# Patient Record
Sex: MALE | Race: ASIAN | NOT HISPANIC OR LATINO | URBAN - METROPOLITAN AREA
[De-identification: names, ages, dates, MRNs, and addresses within clinical notes are randomized per-mention and may not be internally consistent; named-entity substitution may affect disease eponyms.]

---

## 2021-08-06 ENCOUNTER — INPATIENT (INPATIENT)
Facility: HOSPITAL | Age: 61
LOS: 1 days | Discharge: HOME | DRG: 446 | End: 2021-08-08
Attending: STUDENT IN AN ORGANIZED HEALTH CARE EDUCATION/TRAINING PROGRAM | Admitting: STUDENT IN AN ORGANIZED HEALTH CARE EDUCATION/TRAINING PROGRAM
Payer: COMMERCIAL

## 2021-08-06 VITALS
RESPIRATION RATE: 18 BRPM | TEMPERATURE: 98 F | HEIGHT: 68 IN | SYSTOLIC BLOOD PRESSURE: 184 MMHG | WEIGHT: 188.05 LBS | DIASTOLIC BLOOD PRESSURE: 112 MMHG | HEART RATE: 96 BPM | OXYGEN SATURATION: 92 %

## 2021-08-06 LAB
ALBUMIN SERPL ELPH-MCNC: 4 G/DL — SIGNIFICANT CHANGE UP (ref 3.3–5)
ALP SERPL-CCNC: 128 U/L — HIGH (ref 40–120)
ALT FLD-CCNC: 260 U/L — HIGH (ref 10–45)
ANION GAP SERPL CALC-SCNC: 12 MMOL/L — SIGNIFICANT CHANGE UP (ref 5–17)
AST SERPL-CCNC: 121 U/L — HIGH (ref 10–40)
BASOPHILS # BLD AUTO: 0.06 K/UL — SIGNIFICANT CHANGE UP (ref 0–0.2)
BASOPHILS NFR BLD AUTO: 0.6 % — SIGNIFICANT CHANGE UP (ref 0–2)
BILIRUB DIRECT SERPL-MCNC: 5.4 MG/DL — HIGH (ref 0–0.2)
BILIRUB INDIRECT FLD-MCNC: 1.9 MG/DL — HIGH (ref 0.2–1)
BILIRUB SERPL-MCNC: 7.3 MG/DL — HIGH (ref 0.2–1.2)
BILIRUB SERPL-MCNC: 7.3 MG/DL — HIGH (ref 0.2–1.2)
BUN SERPL-MCNC: 14 MG/DL — SIGNIFICANT CHANGE UP (ref 7–23)
CALCIUM SERPL-MCNC: 9.6 MG/DL — SIGNIFICANT CHANGE UP (ref 8.4–10.5)
CHLORIDE SERPL-SCNC: 102 MMOL/L — SIGNIFICANT CHANGE UP (ref 96–108)
CO2 SERPL-SCNC: 21 MMOL/L — LOW (ref 22–31)
CREAT SERPL-MCNC: 0.88 MG/DL — SIGNIFICANT CHANGE UP (ref 0.5–1.3)
EOSINOPHIL # BLD AUTO: 0.06 K/UL — SIGNIFICANT CHANGE UP (ref 0–0.5)
EOSINOPHIL NFR BLD AUTO: 0.6 % — SIGNIFICANT CHANGE UP (ref 0–6)
GLUCOSE SERPL-MCNC: 130 MG/DL — HIGH (ref 70–99)
HCT VFR BLD CALC: 46.3 % — SIGNIFICANT CHANGE UP (ref 39–50)
HGB BLD-MCNC: 15.4 G/DL — SIGNIFICANT CHANGE UP (ref 13–17)
IMM GRANULOCYTES NFR BLD AUTO: 1.1 % — SIGNIFICANT CHANGE UP (ref 0–1.5)
LYMPHOCYTES # BLD AUTO: 0.89 K/UL — LOW (ref 1–3.3)
LYMPHOCYTES # BLD AUTO: 9.3 % — LOW (ref 13–44)
MCHC RBC-ENTMCNC: 31.9 PG — SIGNIFICANT CHANGE UP (ref 27–34)
MCHC RBC-ENTMCNC: 33.3 GM/DL — SIGNIFICANT CHANGE UP (ref 32–36)
MCV RBC AUTO: 95.9 FL — SIGNIFICANT CHANGE UP (ref 80–100)
MONOCYTES # BLD AUTO: 0.97 K/UL — HIGH (ref 0–0.9)
MONOCYTES NFR BLD AUTO: 10.1 % — SIGNIFICANT CHANGE UP (ref 2–14)
NEUTROPHILS # BLD AUTO: 7.5 K/UL — HIGH (ref 1.8–7.4)
NEUTROPHILS NFR BLD AUTO: 78.3 % — HIGH (ref 43–77)
NRBC # BLD: 0 /100 WBCS — SIGNIFICANT CHANGE UP (ref 0–0)
PLATELET # BLD AUTO: 210 K/UL — SIGNIFICANT CHANGE UP (ref 150–400)
POTASSIUM SERPL-MCNC: 3.7 MMOL/L — SIGNIFICANT CHANGE UP (ref 3.5–5.3)
POTASSIUM SERPL-SCNC: 3.7 MMOL/L — SIGNIFICANT CHANGE UP (ref 3.5–5.3)
PROT SERPL-MCNC: 7.2 G/DL — SIGNIFICANT CHANGE UP (ref 6–8.3)
RBC # BLD: 4.83 M/UL — SIGNIFICANT CHANGE UP (ref 4.2–5.8)
RBC # FLD: 12.7 % — SIGNIFICANT CHANGE UP (ref 10.3–14.5)
SODIUM SERPL-SCNC: 135 MMOL/L — SIGNIFICANT CHANGE UP (ref 135–145)
WBC # BLD: 9.59 K/UL — SIGNIFICANT CHANGE UP (ref 3.8–10.5)
WBC # FLD AUTO: 9.59 K/UL — SIGNIFICANT CHANGE UP (ref 3.8–10.5)

## 2021-08-06 NOTE — ED PROVIDER NOTE - OBJECTIVE STATEMENT
61M, no pmh, smoker, p.w fever and jaundice. patient has fever 2 days ago and generalize myalgia. patient went to  today for covid eval and was told to come in after noticing dark urine. patient has generalized abdominal pain but no surgeries. no dysuria, n/v. +HA no headache or change in medication.

## 2021-08-06 NOTE — ED PROVIDER NOTE - CLINICAL SUMMARY MEDICAL DECISION MAKING FREE TEXT BOX
former smoker p.w jaundice and dark urine w. fver for 2days. no dysuria, change in meds, n/v, weight loss. +myalgia. vital signs wnl, nontoxic appearing, NAD. no abdominal pain and aox3, concerning for bilirubinemia vs biliary mass vs hepatitis, vs cholecystitis vs cholangitis though low suspicion, no abdominal pain on exam, but will get RUQ US for biliary obstruction and hepatic eval. will get comprehensive labs to evaluate for hematologic abnormality, renal function, electrolyte derangement, will get hepatitis panel, no travel or diarrhea. dispo pending labs/imaging/further workups. former smoker p.w jaundice and dark urine w. fver for 2days. no dysuria, change in meds, n/v, weight loss. +myalgia. vital signs wnl, nontoxic appearing, NAD. no abdominal pain and aox3, concerning for bilirubinemia vs biliary mass vs hepatitis, vs cholecystitis vs cholangitis vs choledocholithiasis, no abdominal pain on exam, but will get RUQ US for biliary obstruction and hepatic eval. will get comprehensive labs to evaluate for hematologic abnormality, renal function, electrolyte derangement, will get hepatitis panel, no travel or diarrhea. dispo pending labs/imaging/further workups.

## 2021-08-06 NOTE — ED PROVIDER NOTE - NS ED ROS FT
GENERAL: fever no chills, weight changes, nightsweats  EYES: no change in vision  HEENT: no dysplasia, odynophagia, ear pain, rhinorrhea, epistasis   CARDIAC: no chest pain, palpitation   PULMONARY: no productive cough or SOB  GI: no abdominal pain, no nausea or no vomiting, no diarrhea or constipation  : No changes in urination for pain/freq.   SKIN: no bruising   NEURO: no headache, numbness/tingling, extremity weakness   MSK: No joint pain

## 2021-08-06 NOTE — ED PROVIDER NOTE - PROGRESS NOTE DETAILS
Attending Cristina:  received s/o on pt, us not yet read. Called rads, apparently study was not pushed over so he was not aware of study. Getting read now.

## 2021-08-06 NOTE — ED ADULT NURSE NOTE - OBJECTIVE STATEMENT
The pt is a 62 y/o M no PMH presenting to the ED c/o abd pain, loose stools, fever, nausea, dec appetite, dark urine, HA, jaundice x 2 days.  The patient reports generalized abd pain. Pt reports he went to urgent care for a rapid covid swab, which resulted as negative, as per the patient. Upon assessment, pt is AO x 4, speaking in full and complete sentences, abd soft, equal strength bilaterally, skin warm, dry and intact, present peripheral pulses, PERRL. Pt denies CP, dizziness, weakness, SOB. Pt positioned for comfort, appropriate side rails raised, wheels locked, bed in lowest position, pt denies needs at this time.

## 2021-08-06 NOTE — ED PROVIDER NOTE - PHYSICAL EXAMINATION
General: NAD, good hygiene, well developed  HENT: Atraumatic, EOMI, sclera ictus b/l, no conjunctivae injection, moist mucosa.  Neck: normal ROM and trachea midline   Cardiovascular: RRR, S1&2, no M or R, radial pulses equal and b/l  Respiratory: CTABL, no wheezes or crackles, no decreased breath sounds  Abdominal: soft and non-tender non distended, neg for guarding, no CVA tenderness   Extremities: no edema of the legs/feet, distal pulses equal and b/l   Skin: jaundice, well perfused  Neurologic: nonfocal, AAOx3, following commands   Psych: normal mood and affect

## 2021-08-07 DIAGNOSIS — R17 UNSPECIFIED JAUNDICE: ICD-10-CM

## 2021-08-07 DIAGNOSIS — Z00.00 ENCOUNTER FOR GENERAL ADULT MEDICAL EXAMINATION WITHOUT ABNORMAL FINDINGS: ICD-10-CM

## 2021-08-07 DIAGNOSIS — R10.9 UNSPECIFIED ABDOMINAL PAIN: ICD-10-CM

## 2021-08-07 LAB
A1C WITH ESTIMATED AVERAGE GLUCOSE RESULT: 4.8 % — SIGNIFICANT CHANGE UP (ref 4–5.6)
ALBUMIN SERPL ELPH-MCNC: 3.9 G/DL — SIGNIFICANT CHANGE UP (ref 3.3–5)
ALP SERPL-CCNC: 125 U/L — HIGH (ref 40–120)
ALT FLD-CCNC: 212 U/L — HIGH (ref 10–45)
ANION GAP SERPL CALC-SCNC: 16 MMOL/L — SIGNIFICANT CHANGE UP (ref 5–17)
APTT BLD: 33.5 SEC — SIGNIFICANT CHANGE UP (ref 27.5–35.5)
AST SERPL-CCNC: 82 U/L — HIGH (ref 10–40)
BILIRUB SERPL-MCNC: 4 MG/DL — HIGH (ref 0.2–1.2)
BLD GP AB SCN SERPL QL: NEGATIVE — SIGNIFICANT CHANGE UP
BUN SERPL-MCNC: 14 MG/DL — SIGNIFICANT CHANGE UP (ref 7–23)
CALCIUM SERPL-MCNC: 9.7 MG/DL — SIGNIFICANT CHANGE UP (ref 8.4–10.5)
CHLORIDE SERPL-SCNC: 99 MMOL/L — SIGNIFICANT CHANGE UP (ref 96–108)
CO2 SERPL-SCNC: 21 MMOL/L — LOW (ref 22–31)
CREAT SERPL-MCNC: 0.73 MG/DL — SIGNIFICANT CHANGE UP (ref 0.5–1.3)
ESTIMATED AVERAGE GLUCOSE: 91 MG/DL — SIGNIFICANT CHANGE UP (ref 68–114)
GLUCOSE SERPL-MCNC: 178 MG/DL — HIGH (ref 70–99)
HAPTOGLOB SERPL-MCNC: 179 MG/DL — SIGNIFICANT CHANGE UP (ref 34–200)
HCT VFR BLD CALC: 45.4 % — SIGNIFICANT CHANGE UP (ref 39–50)
HGB BLD-MCNC: 15.1 G/DL — SIGNIFICANT CHANGE UP (ref 13–17)
INR BLD: 1.04 RATIO — SIGNIFICANT CHANGE UP (ref 0.88–1.16)
MAGNESIUM SERPL-MCNC: 2.2 MG/DL — SIGNIFICANT CHANGE UP (ref 1.6–2.6)
MCHC RBC-ENTMCNC: 31.9 PG — SIGNIFICANT CHANGE UP (ref 27–34)
MCHC RBC-ENTMCNC: 33.3 GM/DL — SIGNIFICANT CHANGE UP (ref 32–36)
MCV RBC AUTO: 95.8 FL — SIGNIFICANT CHANGE UP (ref 80–100)
NRBC # BLD: 0 /100 WBCS — SIGNIFICANT CHANGE UP (ref 0–0)
PHOSPHATE SERPL-MCNC: 1.8 MG/DL — LOW (ref 2.5–4.5)
PLATELET # BLD AUTO: 210 K/UL — SIGNIFICANT CHANGE UP (ref 150–400)
POTASSIUM SERPL-MCNC: 3.4 MMOL/L — LOW (ref 3.5–5.3)
POTASSIUM SERPL-SCNC: 3.4 MMOL/L — LOW (ref 3.5–5.3)
PROT SERPL-MCNC: 7.3 G/DL — SIGNIFICANT CHANGE UP (ref 6–8.3)
PROTHROM AB SERPL-ACNC: 12.4 SEC — SIGNIFICANT CHANGE UP (ref 10.6–13.6)
RBC # BLD: 4.74 M/UL — SIGNIFICANT CHANGE UP (ref 4.2–5.8)
RBC # FLD: 12.5 % — SIGNIFICANT CHANGE UP (ref 10.3–14.5)
RH IG SCN BLD-IMP: POSITIVE — SIGNIFICANT CHANGE UP
SARS-COV-2 RNA SPEC QL NAA+PROBE: SIGNIFICANT CHANGE UP
SODIUM SERPL-SCNC: 136 MMOL/L — SIGNIFICANT CHANGE UP (ref 135–145)
WBC # BLD: 8.15 K/UL — SIGNIFICANT CHANGE UP (ref 3.8–10.5)
WBC # FLD AUTO: 8.15 K/UL — SIGNIFICANT CHANGE UP (ref 3.8–10.5)

## 2021-08-07 PROCEDURE — 76700 US EXAM ABDOM COMPLETE: CPT | Mod: 26

## 2021-08-07 PROCEDURE — 99223 1ST HOSP IP/OBS HIGH 75: CPT | Mod: GC

## 2021-08-07 PROCEDURE — 74177 CT ABD & PELVIS W/CONTRAST: CPT | Mod: 26,MA

## 2021-08-07 PROCEDURE — 99222 1ST HOSP IP/OBS MODERATE 55: CPT | Mod: GC

## 2021-08-07 RX ORDER — CHOLECALCIFEROL (VITAMIN D3) 125 MCG
1 CAPSULE ORAL
Qty: 0 | Refills: 0 | DISCHARGE

## 2021-08-07 RX ORDER — SODIUM,POTASSIUM PHOSPHATES 278-250MG
1 POWDER IN PACKET (EA) ORAL ONCE
Refills: 0 | Status: COMPLETED | OUTPATIENT
Start: 2021-08-07 | End: 2021-08-07

## 2021-08-07 RX ORDER — ACETAMINOPHEN 500 MG
975 TABLET ORAL ONCE
Refills: 0 | Status: COMPLETED | OUTPATIENT
Start: 2021-08-07 | End: 2021-08-07

## 2021-08-07 RX ORDER — ONDANSETRON 8 MG/1
4 TABLET, FILM COATED ORAL EVERY 8 HOURS
Refills: 0 | Status: DISCONTINUED | OUTPATIENT
Start: 2021-08-07 | End: 2021-08-08

## 2021-08-07 RX ADMIN — Medication 975 MILLIGRAM(S): at 03:09

## 2021-08-07 RX ADMIN — Medication 1 PACKET(S): at 14:40

## 2021-08-07 NOTE — ED ADULT NURSE REASSESSMENT NOTE - NS ED NURSE REASSESS COMMENT FT1
Report received from ERIK Delarosa. Pt resting in stretcher, a&ox3, nad, breathing spontaneous and nonlabored, skin warm and jaundiced in color. Pt reports mild h/a, MD aware awaiting further instructions. Pending dispo

## 2021-08-07 NOTE — H&P ADULT - ASSESSMENT
64 year old man with no PMH presents to the ED with 2 day history of abdominal pain, generalized malaise, fever, yellow diarrhea, and dark urine. CT A/P showed Diverticulosis without diverticulitis. Complete Abdominal US showed a thick irregular fold in the gallbladder fundus of unclear etiology, no cholelithiasis, or sonographic evidence of cholecystitis.   64 year old man with no PMH presents to the ED with 2 day history of abdominal pain, generalized malaise, fever, yellow diarrhea, and dark urine. CT A/P showed Diverticulosis without diverticulitis. Abdominal US showed a thick irregular fold in the gallbladder fundus of unclear etiology, no cholelithiasis, or sonographic evidence of cholecystitis.   64 year old man with no PMH presents to the ED with 2 day history of abdominal pain, generalized malaise, fever, yellow diarrhea, and dark urine. CT A/P showed Diverticulosis without diverticulitis. Abdominal US showed fatty liver and a thick irregular fold in the gallbladder fundus of unclear etiology, no cholelithiasis, or sonographic evidence of cholecystitis.

## 2021-08-07 NOTE — DISCHARGE NOTE PROVIDER - NSDCCPCAREPLAN_GEN_ALL_CORE_FT
PRINCIPAL DISCHARGE DIAGNOSIS  Diagnosis: Jaundice  Assessment and Plan of Treatment: You  came  to  the Emergency Room due to abdominal pain, chills, fever , dark urine,  yellow diarrhea and yellowing of  your eyes.   In the ED you were found to have elevated liver funtion tests, billiary tests,  and bilirubin, an orange yellow pigment that is formed in the liver. The elevated bilirubin led to the yellowing of your skin and eyes called jaundice. CT scan and abdominal ultrasound did not reveal a definite cause for the abdominal pain, or elevated bilirubin and liver tests. While in the hospital your symptoms resolved.    Various blood tests were taken to her diagnose the cause of your symptoms. Please follow up with the results of the tests when you see the hepatologist, a liver specialist.   If you start to develop abdominal pain, yellowing of your skin, nausea, vomiting, fevers, or chills please seek medical attention.

## 2021-08-07 NOTE — H&P ADULT - NSHPREVIEWOFSYSTEMS_GEN_ALL_CORE
CONSTITUTIONAL:  +fevers, +chills, no weakness   EYES/ENT: No visual changes;  No vertigo or throat pain   NECK: No pain or stiffness  RESPIRATORY: No cough, wheezing, hemoptysis; No shortness of breath  CARDIOVASCULAR: No chest pain or palpitations  GASTROINTESTINAL: +abdominal pain, +nausea, +loose yellow stools,  no vomiting, or hematemesis; no constipation. No melena or hematochezia.  GENITOURINARY: No dysuria, frequency or hematuria  MSK: +generalized myalgias  NEUROLOGICAL: No numbness or weakness  SKIN: +jaundice, No itching or rashes

## 2021-08-07 NOTE — DISCHARGE NOTE PROVIDER - NSDCFUADDAPPT_GEN_ALL_CORE_FT
Patient can follow up outpatient with Hepatology at [address] [place] [address] [phone]   Patient can follow up outpatient with Hepatology at [address] [place] [address] [phone].    Please follow up with you Gastroenterologist Dr. Dong Thomason.  Please follow up with your PCP within one week of discharge.

## 2021-08-07 NOTE — CONSULT NOTE ADULT - ASSESSMENT
61M, no pmh, smoker, p/w fever, abd pain and jaundice.     Impression:  #Elevated transaminases with elevated direct bilirubin - concern for passed stone vs infectious etiology such as hep A vs other hepatitis vs bacterial infection/cholestasis however no evidence of cholangitis/cholecystitis on imaging. CT/US without stones or biliary dilation at this time with resolved abd pain.  61M, no pmh, smoker, p/w fever, abd pain and jaundice.     Impression:  #Elevated transaminases with elevated direct bilirubin - likely passed stone, other ddx includes infectious etiology such as hep A vs other hepatitis vs bacterial infection/cholestasis however no evidence of cholangitis/cholecystitis on imaging. CT/US without stones or biliary dilation at this time with resolved abd pain.       Recommendation:  - send acute hep panel  - diet as tolerated  - if continued improvement in labs in AM, no contraindication to d/c  - can follow up outpatient with Hepatology at [address] [place] [address] [phone]      Tiana Ricardo PGY-5  Gastroenterology Fellow  Pager #[phone] (LAST) or [phone] (NS)  Available on [employer] Teams.  Please contact on-call GI fellow via answering service ([phone]) after 5pm and before 8am, and on weekends.

## 2021-08-07 NOTE — CONSULT NOTE ADULT - SUBJECTIVE AND OBJECTIVE BOX
Chief Complaint:  Patient is a 61y old  Male who presents with a chief complaint of     HPI: 61M, no pmh, smoker, p/w fever, abd pain and jaundice. Patient states starting Wednesday evening/Thursday morning he noted fever to 100.4 max at home with diffuse abd pain. Unable to characterize or specify if worse in one location. Reports over the next 2 days only ate chicken noodle soup and gatorades which did not worsen the pain. Also reports loose stools, going multiple times a day with small amounts - no blood or melena. He reports he took advil x2 at home for fever and tylenol x1 as well as tums which slightly helped abd pain. He reports abd pain has resolved. He states his daughters noted that his eyes were yellow which prompted him to go to urgent care. He states a few months ago he had a similar episode of abd pain which self-resolved however denies having jaundice at that time. Denies alc use, other drug use. No personal or FH of liver disease.     US and CT without biliary dilation or stones. US with possible cystic structure in GB however CT negative.     Allergies:  No Known Allergies      Home Medications:    Hospital Medications:      PMHX/PSHX:      Family history:      Social History:     ROS:     General:  No weight loss, fevers, chills, night sweats, fatigue  Eyes:  No vision changes, no yellowing of eyes   ENT:  No throat pain, runny nose  CV:  No chest pain, palpitations  Resp:  No SOB, cough, wheezing  GI:  See HPI  :  No burning with urination, no hematuria   Muscle:  No muscle pain, weakness  Neuro:  No numbness/tingling, memory problems  Psych:  No fatigue, insomnia, mood problems  Heme:  No easy bruisability  Skin:  No rash, itching       PHYSICAL EXAM:     GENERAL:  Appears stated age, well-groomed, well-nourished, no distress  HEENT:  NC/AT,  conjunctivae clear and pink, scleral icterus  CHEST:  Full & symmetric excursion, no increased effort, breath sounds clear  HEART:  Regular rhythm, S1, S2, no murmur/rub/S3/S4, no abdominal bruit, no edema  ABDOMEN:  Soft, non-tender, non-distended, normoactive bowel sounds,  no masses ,  EXTREMITIES:  no cyanosis,clubbing or edema  SKIN:  No rash/erythema/ecchymoses/petechiae/wounds/abscess/warm/dry  NEURO:  Alert, oriented    Vital Signs:  Vital Signs Last 24 Hrs  T(C): 36.6 (07 Aug 2021 08:41), Max: 37.2 (07 Aug 2021 01:35)  T(F): 97.8 (07 Aug 2021 08:41), Max: 99 (07 Aug 2021 05:27)  HR: 76 (07 Aug 2021 08:41) (69 - 96)  BP: 173/98 (07 Aug 2021 08:41) (157/97 - 184/112)  BP(mean): --  RR: 16 (07 Aug 2021 08:41) (15 - 20)  SpO2: 93% (07 Aug 2021 08:41) (92% - 98%)  Daily Height in cm: 172.72 (07 Aug 2021 08:41)    Daily     LABS:                        15.4   9.59  )-----------( 210      ( 06 Aug 2021 22:48 )             46.3     08-06    135  |  102  |  14  ----------------------------<  130<H>  3.7   |  21<L>  |  0.88    Ca    9.6      06 Aug 2021 22:48    TPro  7.2  /  Alb  4.0  /  TBili  7.3<H>  /  DBili  5.4<H>  /  AST  121<H>  /  ALT  260<H>  /  AlkPhos  128<H>  08-06    LIVER FUNCTIONS - ( 06 Aug 2021 22:48 )  Alb: 4.0 g/dL / Pro: 7.2 g/dL / ALK PHOS: 128 U/L / ALT: 260 U/L / AST: 121 U/L / GGT: x               Amylase Serum--      Lipase serum23       Ammonia--      Imaging:      < from: CT Abdomen and Pelvis w/ IV Cont (08.07.21 @ 05:55) >    FINDINGS:  LOWER CHEST: Within normal limits.    LIVER: Within normal limits.  BILE DUCTS: Normal caliber.  GALLBLADDER: Within normal limits.  SPLEEN: Within normal limits.  PANCREAS: Within normal limits.  ADRENALS: Within normal limits.  KIDNEYS/URETERS: Within normal limits.    BLADDER: Incompletely distended.  REPRODUCTIVE ORGANS: Prostate is not enlarged.    BOWEL: The stomach is incompletely distended. There is a large duodenal diverticulum. No bowel obstruction. Appendix is unremarkable. The colon is underdistended without significant fecal load. Diffuse scattered colonic diverticulosis without diverticulitis.  PERITONEUM: No ascites.  VESSELS: Aorta is not dilated.  RETROPERITONEUM/LYMPH NODES: No lymphadenopathy.  ABDOMINAL WALL: Within normal limits.  BONES: Within normal limits.    IMPRESSION:    No explanation for abdominal pain on this CT.  Diverticulosis without diverticulitis.    < end of copied text >      < from: US Abdomen Complete (US Abdomen Complete .) (08.07.21 @ 04:50) >  FINDINGS:    Liver:: Fatty and heterogeneous  Bile ducts: Normal caliber. Common bile duct measures 5 mm.  Gallbladder: No cholelithiasis. Gallbladder wall is minimally thickened. No pericholecystic fluid. Complex thickened fold associated with cystic septation fold in the gallbladder fundus does not show definite appearance of adenomyomatosis. No ringdown artifact is present.  Pancreas: Visualized portions are within normal limits.  Spleen: 9.8 cm. Within normal limits.  Right kidney: 10.7 cm. No hydronephrosis.  Left kidney: 10.7 cm.  No hydronephrosis.  Aorta and IVC: Visualized portions are within normal limits.    IMPRESSION:    No cholelithiasis or sonographic evidence of cholecystitis.    Thick irregular fold in the gallbladder fundus of unclear etiology.  MRI or CT may be obtained for further catheterization.    --- End of Report ---    < end of copied text >

## 2021-08-07 NOTE — H&P ADULT - NSHPLABSRESULTS_GEN_ALL_CORE
Labs:                        15.1   8.15  )-----------( 210      ( 07 Aug 2021 12:02 )             45.4     Auto Eosinophil # x     / Auto Eosinophil % x     / Auto Neutrophil # x     / Auto Neutrophil % x     / BANDS % x                            15.4   9.59  )-----------( 210      ( 06 Aug 2021 22:48 )             46.3     Auto Eosinophil # 0.06  / Auto Eosinophil % 0.6   / Auto Neutrophil # 7.50  / Auto Neutrophil % 78.3  / BANDS % x        Hgb Trend: 15.1<--, 15.4<--    08-07    136  |  99  |  14  ----------------------------<  178<H>  3.4<L>   |  21<L>  |  0.73  08-06    135  |  102  |  14  ----------------------------<  130<H>  3.7   |  21<L>  |  0.88    Ca    9.7      07 Aug 2021 12:02  Mg     2.2     08-07  Phos  1.8     08-07  TPro  7.3  /  Alb  3.9  /  TBili  4.0<H>  /  DBili  x   /  AST  82<H>  /  ALT  212<H>  /  AlkPhos  125<H>  08-07  TPro  7.2  /  Alb  4.0  /  TBili  7.3<H>  /  DBili  5.4<H>  /  AST  121<H>  /  ALT  260<H>  /  AlkPhos  128<H>  08-06    Creatinine Trend: 0.73<--, 0.88<--  PT/INR - ( 07 Aug 2021 12:02 )   PT: 12.4 sec;   INR: 1.04 ratio         PTT - ( 07 Aug 2021 12:02 )  PTT:33.5 sec          ABG:   VENT:       Labs result reviewed by me. LABS:                         15.1   8.15  )-----------( 210      ( 07 Aug 2021 12:02 )             45.4     08-07    136  |  99  |  14  ----------------------------<  178<H>  3.4<L>   |  21<L>  |  0.73    Ca    9.7      07 Aug 2021 12:02  Phos  1.8     08-07  Mg     2.2     08-07    TPro  7.3  /  Alb  3.9  /  TBili  4.0<H>  /  DBili  x   /  AST  82<H>  /  ALT  212<H>  /  AlkPhos  125<H>  08-07    PT/INR - ( 07 Aug 2021 12:02 )   PT: 12.4 sec;   INR: 1.04 ratio         PTT - ( 07 Aug 2021 12:02 )  PTT:33.5 sec      RADIOLOGY, EKG & ADDITIONAL TESTS: Reviewed.   < from: CT Abdomen and Pelvis w/ IV Cont (08.07.21 @ 05:55) >    XAM:  CT ABDOMEN AND PELVIS IC                          PROCEDURE DATE:  08/07/2021      INTERPRETATION:  CLINICAL INFORMATION: Abdominal pain    COMPARISON: None.    CONTRAST/COMPLICATIONS:  IV Contrast: 90 cc of Omnipaque 350.  10 cc wasdiscarded.  Oral Contrast: None  Complications: None reported.    PROCEDURE:  CT of the Abdomen and Pelvis was performed.  Sagittal and coronal reformats were performed.    FINDINGS:  LOWER CHEST: Within normal limits.    LIVER: Within normal limits.  BILE DUCTS: Normal caliber.  GALLBLADDER: Within normal limits.  SPLEEN: Within normal limits.  PANCREAS: Within normal limits.  ADRENALS: Within normal limits.  KIDNEYS/URETERS: Within normal limits.    BLADDER: Incompletely distended.  REPRODUCTIVE ORGANS: Prostate is not enlarged.    BOWEL: The stomach is incompletely distended. There is a large duodenal diverticulum. No bowel obstruction. Appendix is unremarkable. The colon is underdistended without significant fecal load. Diffuse scattered colonic diverticulosis without diverticulitis.  PERITONEUM: No ascites.  VESSELS: Aorta is not dilated.  RETROPERITONEUM/LYMPH NODES: No lymphadenopathy.  ABDOMINAL WALL: Within normal limits.  BONES: Within normal limits.    IMPRESSION:    No explanation for abdominal pain on this CT.  Diverticulosis without diverticulitis.    < from: US Abdomen Complete (US Abdomen Complete .) (08.07.21 @ 04:50) >        < end of copied text >

## 2021-08-07 NOTE — DISCHARGE NOTE NURSING/CASE MANAGEMENT/SOCIAL WORK - PATIENT PORTAL LINK FT
You can access the FollowMyHealth Patient Portal offered by [facility] by registering at the following website: [email or web address]. By joining [employer]’s FollowMyHealth portal, you will also be able to view your health information using other applications (apps) compatible with our system.

## 2021-08-07 NOTE — ED ADULT NURSE REASSESSMENT NOTE - NS ED NURSE REASSESS COMMENT FT1
Report received from Aaliyah VALENCIA in gold. Patient seen resting comfortably in bed and on phone watching show. Patient has no complaints at this time, aware that he is admitted and is waiting for bed. Safety and comfort provided.

## 2021-08-07 NOTE — DISCHARGE NOTE NURSING/CASE MANAGEMENT/SOCIAL WORK - NSDCPEEMAIL_GEN_ALL_CORE
[facility] email [email or web address]

## 2021-08-07 NOTE — H&P ADULT - HISTORY OF PRESENT ILLNESS
Patient is a 61 man with no PMH who presented to the ED from urgent care where he was found to have dark urine. Patient reports that on Wednesday evening after he ate [employer] he developed severe abdominal pain, generalized myalgias, chills and a fever of 100 F. He took advil which provided minimal relief.     al pain, generalized myalgias that began 3 days ago on  Patient is a 61 man with no PMH or PSH who presented to the ED from urgent care where he was found to have dark urine. Patient reports that on Wednesday evening after he ate Edna's he developed severe diffuse abdominal pain, generalized myalgias, chills and a fever of 100 F.  His daughters noticed that his eyes and skin were more yellow in color. He took advil which provided minimal relief. He reports feeling nauseous at that time and denied any episode of vomiting. He denied constipation. However he reports several episodes of loose yellow stools from Wednesday night until this morning.  The patient went to an Urgent Care Center on Friday for a COVID test because he was worried that he had COVID. At the Urgent Care center he was found to have dark urine and he was told to come to the ER. He denies any dysuria, increased frequency, or urgency. He denies any headache, dizziness, cough, chest pain, or palpitations. He does not take any medications. He only takes vitamin D. The patient denies any recent travel or sick contacts. He is a smoker and currently smokes 1 cigarette per day. He denies alcohol and recreational drug use. He has not been sexually active for 7 years since his wife  of lung cancer.  Patient is a 61 man with no PMH or PSH who presented to the ED from urgent care where he was found to have dark urine. Patient reports that on Wednesday evening after he ate Edna's he developed severe diffuse abdominal pain, generalized myalgias, chills and a fever of 100 F.  His daughters noticed that his eyes and skin were more yellow in color. He took advil which provided minimal relief. He reports feeling nauseous at that time and denied any episode of vomiting. He denied constipation. However he reports several frequent episodes of loose yellow stools from Wednesday night until this morning.  The patient went to an Urgent Care Center on Friday for a COVID test because he was worried that he had COVID. At the Urgent Care center he was found to have dark urine and he was told to come to the ER. He denies any dysuria, increased frequency, or urgency. He denies any headache, dizziness, cough, chest pain, or palpitations. He does not take any medications. He only takes vitamin D. The patient denies any recent travel or sick contacts. He is a smoker and currently smokes 1 cigarette per day. He denies alcohol and recreational drug use. He has not been sexually active for 7 years since his wife  of lung cancer.  Patient is a 61 man with no PMH or PSH who presented to the ED from urgent care where he was found to have dark urine. Patient reports that on Wednesday evening after he ate Edna's he developed severe diffuse abdominal pain, generalized myalgias, chills and a fever of 100 F.  His daughters noticed that his eyes and skin were more yellow in color. He took advil which provided minimal relief. He reports feeling nauseous at that time and denied any episode of vomiting. He denied constipation. However he reports several frequent episodes of loose yellow stools from Wednesday night until this morning.  The patient went to an Urgent Care Center on Friday for a COVID test because he was worried that he had COVID. At the Urgent Care center he was found to have dark urine and he was told to come to the ER. He denies any dysuria, increased frequency, or urgency. He denies any headache, dizziness, cough, chest pain, or palpitations. He does not take any medications. He only takes vitamin D. The patient denies any recent travel or sick contacts. He is a smoker and currently smokes 1 cigarette per day. He denies alcohol and recreational drug use. He has not been sexually active for 7 years since his wife  of lung cancer.     In the ED the patient received tylenol. On admission his total bilirubin was 7.3, a direct bilirubin of 5.4, indirect bilirubin 1.9, alk phos of 128, AST of 121 and ALT of 260. CT A/P showed diverticulosis. Abdominal ultrasound showed showed a thick irregular fold in the gallbladder fundus of unclear etiology, no cholelithiasis, or sonographic evidence of cholecystitis.

## 2021-08-07 NOTE — H&P ADULT - PROBLEM SELECTOR PLAN 1
-Acute onset jaundice since 8/4  -Dark urine on 8/6  -Yellow diarrhea from 8/4- 8/6  -Jaundice etiology due to hepatitis vs al  T. bili - 7.3 --> 4.0  Alk Phos: 128-->125  AST: 121 -->82  ALT: 260 -->212  Indirect Bili: 5.4   CT A/P: Diverticulosis  Complete abdominal Ultrasound: showed a thick irregular fold in the gallbladder fundus of unclear etiology, no cholelithiasis, or sonographic evidence of cholecystitis      Plan:  -Hepatology Consult   -trend bilirubin  -RUQ US -Acute onset jaundice since 8/4  -Dark urine on 8/6  -Yellow diarrhea from 8/4- 8/6  T. bili - 7.3 --> 4.0  Alk Phos: 128-->125  AST: 121 -->82  ALT: 260 -->212  Indirect Bili: 5.4   -Jaundice etiology due to infectious etiology most likely viral hepatitis, however must consider other causes such as bacterial infection, autoimmune, obstruction  elevated less likely due to hemolytic anemia- LDH:198  haptoglobin: 179  CT A/P: Diverticulosis  Complete abdominal Ultrasound: showed a thick irregular fold in the gallbladder fundus of unclear etiology, no cholelithiasis, or sonographic evidence of cholecystitis    Plan:  -Hepatology Consult   -trend bilirubin  -RUQ US  -hepatitis panel  -SHAUN, Antimitochondrial antibodies -Acute onset jaundice since 8/4  -Dark urine on 8/6  -Yellow diarrhea from 8/4- 8/6  T. bili - 7.3 --> 4.0  Alk Phos: 128-->125  AST: 121 -->82  ALT: 260 -->212  Direct Bili: 5.4   -Jaundice etiology due to infectious etiology most likely viral hepatitis, however must consider other causes such as bacterial infection, autoimmune, or obstruction  -Elevated bili less likely due to hemolytic anemia- LDH:198  haptoglobin: 179  CT A/P: Diverticulosis  Complete abdominal Ultrasound: showed a thick irregular fold in the gallbladder fundus of unclear etiology, no cholelithiasis, or sonographic evidence of cholecystitis    Plan:  -Hepatology Consult   -trend bilirubin  -f/u hepatitis panel, SHAUN, Antimitochondrial antibodies, ANCA, CMV, EBV, Ehrlichia, Anaplasma, babesia labs -Acute onset jaundice since 8/4  -Dark urine on 8/6  -Yellow diarrhea from 8/4- 8/6  T. bili - 7.3 --> 4.0  Alk Phos: 128-->125  AST: 121 -->82  ALT: 260 -->212  Direct Bili: 5.4   -Jaundice etiology due to infectious etiology most likely viral hepatitis, however must consider other causes such as bacterial infection, autoimmune, or obstruction  -Elevated bili less likely due to hemolytic anemia- LDH:198  haptoglobin: 179  CT A/P: Diverticulosis  Complete abdominal Ultrasound: showed fatty liver a thick irregular fold in the gallbladder fundus of unclear etiology, no cholelithiasis, or sonographic evidence of cholecystitis    Plan:  -Hepatology Consult   -trend bilirubin  -f/u hepatitis panel, SHAUN, Antimitochondrial antibodies, ANCA, CMV, EBV, Ehrlichia, Anaplasma, babesia labs

## 2021-08-07 NOTE — H&P ADULT - NSICDXFAMILYHX_GEN_ALL_CORE_FT
FAMILY HISTORY:  Father  Still living? Unknown  FH: prostate cancer, Age at diagnosis: Age Unknown    Sibling  Still living? Unknown  FH: prostate cancer, Age at diagnosis: Age Unknown

## 2021-08-07 NOTE — DISCHARGE NOTE NURSING/CASE MANAGEMENT/SOCIAL WORK - NSDCPEWEB_GEN_ALL_CORE
[facility] for Tobacco Control website --- [email or web address] website --- [email or web address]

## 2021-08-07 NOTE — DISCHARGE NOTE PROVIDER - HOSPITAL COURSE
Patient is a 61 man with no PMH or PSH who presented to the ED from urgent care where he was found to have dark urine. Patient reports that on Wednesday evening after he ate Edna's he developed severe diffuse abdominal pain, generalized myalgias, chills and a fever of 100 F.  His daughters noticed that his eyes and skin were more yellow in color. He took advil which provided minimal relief. He reports feeling nauseous at that time and denied any episode of vomiting. He denied constipation. However he reports several frequent episodes of loose yellow stools from Wednesday night until this morning.  The patient went to an Urgent Care Center on Friday for a COVID test because he was worried that he had COVID. At the Urgent Care center he was found to have dark urine and he was told to come to the ER. He denies any dysuria, increased frequency, or urgency. He denies any headache, dizziness, cough, chest pain, or palpitations. He does not take any medications. He only takes vitamin D. The patient denies any recent travel or sick contacts. He is a smoker and currently smokes 1 cigarette per day. He denies alcohol and recreational drug use. He has not been sexually active for 7 years since his wife  of lung cancer.    Patient is a 61 man with no PMH or PSH who presented to the ED from urgent care where he was found to have dark urine. Patient reports that on Wednesday evening after he ate Edna's he developed severe diffuse abdominal pain, generalized myalgias, chills and a fever of 100 F.  His daughters noticed that his eyes and skin were more yellow in color. He took advil which provided minimal relief. He reports feeling nauseous at that time and denied any episode of vomiting. He denied constipation. However he reports several frequent episodes of loose yellow stools from Wednesday night until this morning.  The patient went to an Urgent Care Center on Friday for a COVID test because he was worried that he had COVID. At the Urgent Care center he was found to have dark urine and he was told to come to the ER. He denies any dysuria, increased frequency, or urgency. He denies any headache, dizziness, cough, chest pain, or palpitations. He does not take any medications. He only takes vitamin D. The patient denies any recent travel or sick contacts. He is a smoker and currently smokes 1 cigarette per day. He denies alcohol and recreational drug use. He has not been sexually active for 7 years since his wife  of lung cancer.     In the ED the patient received tylenol. On admission his total bilirubin was 7.3, a direct bilirubin of 5.4, indirect bilirubin 1.9, alk phos of CT A/P showed diverticulosis. Abdominal ultrasound showed showed a thick irregular fold in the gallbladder fundus of unclear etiology, no cholelithiasis, or sonographic evidence of cholecystitis.    Hepatology was consulted and recommended a viral hepatitis panel.     Over the hospital course the biliary labs improved    Patient is a 61 man with no PMH or PSH who presented to the ED from urgent care where he was found to have dark urine. Patient reports that on Wednesday evening after he ate Edna's he developed severe diffuse abdominal pain, generalized myalgias, chills and a fever of 100 F.  His daughters noticed that his eyes and skin were more yellow in color. He took advil which provided minimal relief. He reports feeling nauseous at that time and denied any episode of vomiting. He denied constipation. However he reports several frequent episodes of loose yellow stools from Wednesday night until this morning.  The patient went to an Urgent Care Center on Friday for a COVID test because he was worried that he had COVID. At the Urgent Care center he was found to have dark urine and he was told to come to the ER. He denies any dysuria, increased frequency, or urgency. He denies any headache, dizziness, cough, chest pain, or palpitations. He does not take any medications. He only takes vitamin D. The patient denies any recent travel or sick contacts. He is a smoker and currently smokes 1 cigarette per day. He denies alcohol and recreational drug use. He has not been sexually active for 7 years since his wife  of lung cancer.     In the ED the patient received tylenol. On admission his total bilirubin was 7.3, a direct bilirubin of 5.4, indirect bilirubin 1.9, alk phos of 128, AST of 121 and ALT of 260. CT A/P showed diverticulosis. Abdominal ultrasound showed showed a thick irregular fold in the gallbladder fundus of unclear etiology, no cholelithiasis, or sonographic evidence of cholecystitis.    Hepatology was consulted and recommended a viral hepatitis panel.     Over the hospital course the biliary and hepatic labs improved.    Patient was advised to follow up    Patient is a 61 man with no PMH or PSH who presented to the ED from urgent care where he was found to have dark urine. Patient reports that on Wednesday evening after he ate Edna's he developed severe diffuse abdominal pain, generalized myalgias, chills and a fever of 100 F.  His daughters noticed that his eyes and skin were more yellow in color. He took advil which provided minimal relief. He reports feeling nauseous at that time and denied any episode of vomiting. He denied constipation. However he reports several frequent episodes of loose yellow stools from Wednesday night until this morning.  The patient went to an Urgent Care Center on Friday for a COVID test because he was worried that he had COVID. At the Urgent Care center he was found to have dark urine and he was told to come to the ER. He denies any dysuria, increased frequency, or urgency. He denies any headache, dizziness, cough, chest pain, or palpitations. He does not take any medications. He only takes vitamin D. The patient denies any recent travel or sick contacts. He is a smoker and currently smokes 1 cigarette per day. He denies alcohol and recreational drug use. He has not been sexually active for 7 years since his wife  of lung cancer.     In the ED the patient received tylenol. On admission his total bilirubin was 7.3, a direct bilirubin of 5.4, indirect bilirubin 1.9, alk phos of 128, AST of 121 and ALT of 260. CT A/P showed diverticulosis. Abdominal ultrasound showed showed a thick irregular fold in the gallbladder fundus of unclear etiology, no cholelithiasis, or sonographic evidence of cholecystitis.    Hepatology was consulted and recommended a viral hepatitis panel.     Over the hospital course the biliary and hepatic labs improved.    Patient was advised to follow up with hepatology outpatient   Patient is a 61 man with no PMH or PSH who presented to the ED from urgent care where he was found to have dark urine. Patient reports that on Wednesday evening after he ate Edna's he developed severe diffuse abdominal pain, generalized myalgias, chills and a fever of 100 F.  His daughters noticed that his eyes and skin were more yellow in color. He took advil which provided minimal relief. He reports feeling nauseous at that time and denied any episode of vomiting. He denied constipation. However he reports several frequent episodes of loose yellow stools from Wednesday night until this morning.  The patient went to an Urgent Care Center on Friday for a COVID test because he was worried that he had COVID. At the Urgent Care center he was found to have dark urine and he was told to come to the ER. He denies any dysuria, increased frequency, or urgency. He denies any headache, dizziness, cough, chest pain, or palpitations. He does not take any medications. He only takes vitamin D. The patient denies any recent travel or sick contacts. He is a smoker and currently smokes 1 cigarette per day. He denies alcohol and recreational drug use. He has not been sexually active for 7 years since his wife  of lung cancer.     In the ED the patient received tylenol. On admission his total bilirubin was 7.3, a direct bilirubin of 5.4, indirect bilirubin 1.9, alk phos of 128, AST of 121 and ALT of 260. CT A/P showed diverticulosis without diverticulitis and a large duodenal diverticulum Abdominal ultrasound showed showed a thick irregular fold in the gallbladder fundus of unclear etiology, no cholelithiasis, or sonographic evidence of cholecystitis.      Hepatology was consulted and recommended a viral hepatitis panel.     Over the hospital course the biliary and hepatic labs improved.    Patient was advised to follow up with hepatology outpatient   Patient is a 61 man with no PMH or PSH who presented to the ED from urgent care where he was found to have dark urine. Patient reports that on Wednesday evening after he ate Edna's he developed severe diffuse abdominal pain, generalized myalgias, chills and a fever of 100 F.  His daughters noticed that his eyes and skin were more yellow in color. He took advil which provided minimal relief. He reports feeling nauseous at that time and denied any episode of vomiting. He denied constipation. However he reports several frequent episodes of loose yellow stools from Wednesday night until this morning.  The patient went to an Urgent Care Center on Friday for a COVID test because he was worried that he had COVID. At the Urgent Care center he was found to have dark urine and he was told to come to the ER. He denies any dysuria, increased frequency, or urgency. He denies any headache, dizziness, cough, chest pain, or palpitations. He does not take any medications. He only takes vitamin D. The patient denies any recent travel or sick contacts. He is a smoker and currently smokes 1 cigarette per day. He denies alcohol and recreational drug use. He has not been sexually active for 7 years since his wife  of lung cancer.     In the ED the patient received tylenol. On admission his total bilirubin was 7.3, a direct bilirubin of 5.4, indirect bilirubin 1.9, alk phos of 128, AST of 121 and ALT of 260. CT A/P showed diverticulosis without diverticulitis and a large duodenal diverticulum Abdominal ultrasound showed showed a thick irregular fold in the gallbladder fundus of unclear etiology, no cholelithiasis, or sonographic evidence of cholecystitis.      Hepatology was consulted and recommended a viral hepatitis panel. They also believe that based on the patient's clinical picture it is most likely that the pain was due to a gallstone that passed or the cyst in the gallbladder, and they recommend that the patient has his gallbladder removed as outpatient.       By the following day  the biliary and hepatic labs improved. Patient was advised to follow up with his Gastroenterologist and Hepatology outpatient   Patient is a 61 man with no PMH or PSH who presented to the ED from urgent care where he was found to have dark urine. Patient reports that on Wednesday evening after he ate Edna's he developed severe diffuse abdominal pain, generalized myalgias, chills and a fever of 100 F.  His daughters noticed that his eyes and skin were more yellow in color. He took advil which provided minimal relief. He reports feeling nauseous at that time and denied any episode of vomiting. He denied constipation. However he reports several frequent episodes of loose yellow stools from Wednesday night until this morning.  The patient went to an Urgent Care Center on Friday for a COVID test because he was worried that he had COVID. At the Urgent Care center he was found to have dark urine and he was told to come to the ER. He denies any dysuria, increased frequency, or urgency. He denies any headache, dizziness, cough, chest pain, or palpitations. He does not take any medications. He only takes vitamin D. The patient denies any recent travel or sick contacts. He is a smoker and currently smokes 1 cigarette per day. He denies alcohol and recreational drug use. He has not been sexually active for 7 years since his wife  of lung cancer.     In the ED the patient received tylenol. On admission his total bilirubin was 7.3, a direct bilirubin of 5.4, indirect bilirubin 1.9, alk phos of 128, AST of 121 and ALT of 260. CT A/P showed diverticulosis without diverticulitis and a large duodenal diverticulum Abdominal ultrasound showed showed a thick irregular fold in the gallbladder fundus of unclear etiology, no cholelithiasis, or sonographic evidence of cholecystitis.      Hepatology was consulted and recommended a viral hepatitis panel. They also believe that based on the patient's clinical picture it is most likely that the pain was due to a gallstone that passed or the cyst in the gallbladder, and they recommend that the patient has his gallbladder removed as outpatient.       By the following day  the biliary and hepatic labs improved. Patient was advised to follow up with his Gastroenterologist and Hepatology outpatient.       Patient is a 61 man with no PMH or PSH who presented to the ED from urgent care where he was found to have dark urine. Patient reports that on Wednesday evening after he ate Edna's he developed severe diffuse abdominal pain, generalized myalgias, chills and a fever of 100 F.  His daughters noticed that his eyes and skin were more yellow in color. He took advil which provided minimal relief. He reports feeling nauseous at that time and denied any episode of vomiting. He denied constipation. However he reports several frequent episodes of loose yellow stools from Wednesday night until this morning.  The patient went to an Urgent Care Center on Friday for a COVID test because he was worried that he had COVID. At the Urgent Care center he was found to have dark urine and he was told to come to the ER. He denies any dysuria, increased frequency, or urgency. He denies any headache, dizziness, cough, chest pain, or palpitations. He does not take any medications. He only takes vitamin D. The patient denies any recent travel or sick contacts. He is a smoker and currently smokes 1 cigarette per day. He denies alcohol and recreational drug use. He has not been sexually active for 7 years since his wife  of lung cancer.     In the ED the patient received tylenol. On admission his total bilirubin was 7.3, a direct bilirubin of 5.4, indirect bilirubin 1.9, alk phos of 128, AST of 121 and ALT of 260. CT A/P showed diverticulosis without diverticulitis and a large duodenal diverticulum Abdominal ultrasound showed showed a thick irregular fold in the gallbladder fundus of unclear etiology, no cholelithiasis, or sonographic evidence of cholecystitis.      Hepatology was consulted and recommended a viral hepatitis panel. They also believe that based on the patient's clinical picture it is most likely that the pain was due to a gallstone that passed or the cyst in the gallbladder, and they recommend that the patient has his gallbladder removed as outpatient.       By the following day  the biliary and hepatic labs improved. Patient was advised to follow up with his Gastroenterologist and Hepatology outpatient.    He was also noted to have an elevated BP throughout his admission and an O2 sat of 93% on room air. He was advised to follow up with his PCP outpatient

## 2021-08-07 NOTE — H&P ADULT - PROBLEM SELECTOR PLAN 3
DVT: no pharmacologic prophylaxis is indicated   Diet: low sodium  Dispo: Home pending clinical improvement DVT: no pharmacologic prophylaxis is indicated   Diet: DASH diet  Dispo: Home pending clinical improvement

## 2021-08-07 NOTE — H&P ADULT - ATTENDING COMMENTS
Pt seen and examined at bedside. Agree with assessment above. Pt presenting with fever, abdominal pain and jaundice. CT personally reviewed. Given CT and Abdominal sono without stones suspect likely viral mediated process given pattern of ALP and transaminase elevation. Repeat CMP already improving suggesting a acute process. Suspect possible Hepatitis A as culprit. Hepatology consulted. Will f.u recommendations. Will check hepatitis panel in addition to labs above. Should transaminases and bili continue to improve would expect likely discharge home with outpatient f/u

## 2021-08-07 NOTE — DISCHARGE NOTE NURSING/CASE MANAGEMENT/SOCIAL WORK - NSDCFUADDAPPT_GEN_ALL_CORE_FT
Patient can follow up outpatient with Hepatology at [address] [place] [address] [phone]

## 2021-08-07 NOTE — DISCHARGE NOTE PROVIDER - CARE PROVIDERS DIRECT ADDRESSES
[email or web address]

## 2021-08-07 NOTE — H&P ADULT - NSHPSOCIALHISTORY_GEN_ALL_CORE
He lives with his four daughters and his mother in [facility]. He is a  and his wife  7 years go due to lung cancer. He is a long time smoker and used to smoke about 1.5 packs a day. However he has cutback to 1 cigarette a day for the past year. He denies alcohol use or recreational use. He has not been sexually active for 7 years since his wife  of lung cancer. He works in the park as a . He lives with his four daughters and his mother in [facility] He is a  and his wife  7 years go due to lung cancer. He is a long time smoker and used to smoke about 0.5 packs a day. However he has cutback to 1 cigarette a day for the past year. He denies alcohol use or recreational use. He has not been sexually active for 7 years since his wife  of lung cancer. He works in the park as a .

## 2021-08-07 NOTE — CONSULT NOTE ADULT - ATTENDING COMMENTS
61M, overweight BMI 28, adm. with three days of severe mid-abdominal pain, mild subjective fevers, then jaundice. Had a similar episode two months ago that resolved. He took ibuprofen and acetaminophen a couple of times, found to have elevated LFTs, bilirubin 7.3, , AST/ALt 121/260, with rapid improvement upon repeat, bilirubin 4.0, AST/ALT 82/212. Pain has resolved.  US showed fatty liver, no gallstones and complex cyst in gallbladder fundus. CT a large duodenal diverticulum    - clinical picture indicates passage of gallstone as cause vs. the cyst in the gallbladder, given pain and rapid spontaneous resolution of pain and improvement of LFTs, even though pain not clearly in the RUQ and fairly constant until resolution.       - D/c home soon  - would remove gallbladder as outpatient

## 2021-08-07 NOTE — H&P ADULT - NSHPPHYSICALEXAM_GEN_ALL_CORE
T(C): 36.8 (08-07-21 @ 12:35), Max: 37.2 (08-07-21 @ 01:35)  HR: 77 (08-07-21 @ 12:35) (69 - 96)  BP: 150/85 (08-07-21 @ 12:35) (150/85 - 184/112)  RR: 18 (08-07-21 @ 12:35) (15 - 20)  SpO2: 93% (08-07-21 @ 12:35) (92% - 98%)    GENERAL: well developed, well groomed, Laying comfortably, NAD  HEENT: Atraumatic, EOMI, PERRL, scleral icterus bilaterally, MMM  NECK: supple, No JVD  LUNG: Clear to auscultation bilaterally; No wheeze, crackles or rhonci  HEART: Regular rate and rhythm; No murmurs, rubs, or gallops  ABDOMEN: Soft, normoactive bowel sounds, Nontender, Nondistended, negative clark's, no guarding  EXTREMITIES:  No LE edema, Peripheral Pulses intact, No clubbing, cyanosis, or edema  PSYCH: AAOx3, normal mood and affect  NEUROLOGY: non-focal, strength 5/5 in all extremities, sensation intact  SKIN: general jaundice, No rashes T(C): 36.8 (08-07-21 @ 12:35), Max: 37.2 (08-07-21 @ 01:35)  HR: 77 (08-07-21 @ 12:35) (69 - 96)  BP: 150/85 (08-07-21 @ 12:35) (150/85 - 184/112)  RR: 18 (08-07-21 @ 12:35) (15 - 20)  SpO2: 93% (08-07-21 @ 12:35) (92% - 98%)    GENERAL: well developed, well groomed, Laying comfortably, NAD  HEENT: Atraumatic, EOMI, PERRL, scleral icterus bilaterally, MMM  NECK: supple, No JVD  LUNG: Clear to auscultation bilaterally; No wheeze, crackles or rhonci  HEART: Regular rate and rhythm; No murmurs, rubs, or gallops  ABDOMEN: Soft, normoactive bowel sounds, Nontender, Nondistended, negative Mark's sign, no guarding  EXTREMITIES:  No LE edema, Peripheral Pulses intact, No clubbing, cyanosis, or edema  PSYCH: AAOx3, normal mood and affect  NEUROLOGY: non-focal, strength 5/5 in all extremities, sensation intact  SKIN: general jaundice, No rashes

## 2021-08-08 VITALS
DIASTOLIC BLOOD PRESSURE: 90 MMHG | TEMPERATURE: 98 F | HEART RATE: 76 BPM | SYSTOLIC BLOOD PRESSURE: 145 MMHG | RESPIRATION RATE: 20 BRPM | OXYGEN SATURATION: 94 %

## 2021-08-08 DIAGNOSIS — I10 ESSENTIAL (PRIMARY) HYPERTENSION: ICD-10-CM

## 2021-08-08 DIAGNOSIS — R09.02 HYPOXEMIA: ICD-10-CM

## 2021-08-08 LAB
ALBUMIN SERPL ELPH-MCNC: 3.7 G/DL — SIGNIFICANT CHANGE UP (ref 3.3–5)
ALP SERPL-CCNC: 105 U/L — SIGNIFICANT CHANGE UP (ref 40–120)
ALT FLD-CCNC: 138 U/L — HIGH (ref 10–45)
ANION GAP SERPL CALC-SCNC: 13 MMOL/L — SIGNIFICANT CHANGE UP (ref 5–17)
AST SERPL-CCNC: 40 U/L — SIGNIFICANT CHANGE UP (ref 10–40)
BILIRUB SERPL-MCNC: 2.2 MG/DL — HIGH (ref 0.2–1.2)
BUN SERPL-MCNC: 14 MG/DL — SIGNIFICANT CHANGE UP (ref 7–23)
CALCIUM SERPL-MCNC: 9.1 MG/DL — SIGNIFICANT CHANGE UP (ref 8.4–10.5)
CHLORIDE SERPL-SCNC: 101 MMOL/L — SIGNIFICANT CHANGE UP (ref 96–108)
CO2 SERPL-SCNC: 24 MMOL/L — SIGNIFICANT CHANGE UP (ref 22–31)
COVID-19 SPIKE DOMAIN AB INTERP: POSITIVE
COVID-19 SPIKE DOMAIN ANTIBODY RESULT: >250 U/ML — HIGH
CREAT SERPL-MCNC: 0.79 MG/DL — SIGNIFICANT CHANGE UP (ref 0.5–1.3)
CULTURE RESULTS: SIGNIFICANT CHANGE UP
GLUCOSE SERPL-MCNC: 108 MG/DL — HIGH (ref 70–99)
HAV IGM SER-ACNC: SIGNIFICANT CHANGE UP
HBV CORE IGM SER-ACNC: SIGNIFICANT CHANGE UP
HBV SURFACE AG SER-ACNC: SIGNIFICANT CHANGE UP
HCT VFR BLD CALC: 42.4 % — SIGNIFICANT CHANGE UP (ref 39–50)
HCV AB S/CO SERPL IA: 0.09 S/CO — SIGNIFICANT CHANGE UP (ref 0–0.99)
HCV AB SERPL-IMP: SIGNIFICANT CHANGE UP
HGB BLD-MCNC: 14.2 G/DL — SIGNIFICANT CHANGE UP (ref 13–17)
MAGNESIUM SERPL-MCNC: 2.1 MG/DL — SIGNIFICANT CHANGE UP (ref 1.6–2.6)
MCHC RBC-ENTMCNC: 32.1 PG — SIGNIFICANT CHANGE UP (ref 27–34)
MCHC RBC-ENTMCNC: 33.5 GM/DL — SIGNIFICANT CHANGE UP (ref 32–36)
MCV RBC AUTO: 95.9 FL — SIGNIFICANT CHANGE UP (ref 80–100)
NRBC # BLD: 0 /100 WBCS — SIGNIFICANT CHANGE UP (ref 0–0)
PHOSPHATE SERPL-MCNC: 2.9 MG/DL — SIGNIFICANT CHANGE UP (ref 2.5–4.5)
PLATELET # BLD AUTO: 212 K/UL — SIGNIFICANT CHANGE UP (ref 150–400)
POTASSIUM SERPL-MCNC: 3.9 MMOL/L — SIGNIFICANT CHANGE UP (ref 3.5–5.3)
POTASSIUM SERPL-SCNC: 3.9 MMOL/L — SIGNIFICANT CHANGE UP (ref 3.5–5.3)
PROT SERPL-MCNC: 6.7 G/DL — SIGNIFICANT CHANGE UP (ref 6–8.3)
RBC # BLD: 4.42 M/UL — SIGNIFICANT CHANGE UP (ref 4.2–5.8)
RBC # FLD: 12.5 % — SIGNIFICANT CHANGE UP (ref 10.3–14.5)
SARS-COV-2 IGG+IGM SERPL QL IA: >250 U/ML — HIGH
SARS-COV-2 IGG+IGM SERPL QL IA: POSITIVE
SODIUM SERPL-SCNC: 138 MMOL/L — SIGNIFICANT CHANGE UP (ref 135–145)
SPECIMEN SOURCE: SIGNIFICANT CHANGE UP
WBC # BLD: 7.97 K/UL — SIGNIFICANT CHANGE UP (ref 3.8–10.5)
WBC # FLD AUTO: 7.97 K/UL — SIGNIFICANT CHANGE UP (ref 3.8–10.5)

## 2021-08-08 PROCEDURE — 86663 EPSTEIN-BARR ANTIBODY: CPT

## 2021-08-08 PROCEDURE — 99285 EMERGENCY DEPT VISIT HI MDM: CPT | Mod: 25

## 2021-08-08 PROCEDURE — 83010 ASSAY OF HAPTOGLOBIN QUANT: CPT

## 2021-08-08 PROCEDURE — 83036 HEMOGLOBIN GLYCOSYLATED A1C: CPT

## 2021-08-08 PROCEDURE — 85730 THROMBOPLASTIN TIME PARTIAL: CPT

## 2021-08-08 PROCEDURE — 87798 DETECT AGENT NOS DNA AMP: CPT

## 2021-08-08 PROCEDURE — 86036 ANCA SCREEN EACH ANTIBODY: CPT

## 2021-08-08 PROCEDURE — 87040 BLOOD CULTURE FOR BACTERIA: CPT

## 2021-08-08 PROCEDURE — 86038 ANTINUCLEAR ANTIBODIES: CPT

## 2021-08-08 PROCEDURE — 83615 LACTATE (LD) (LDH) ENZYME: CPT

## 2021-08-08 PROCEDURE — 86665 EPSTEIN-BARR CAPSID VCA: CPT

## 2021-08-08 PROCEDURE — U0005: CPT

## 2021-08-08 PROCEDURE — 80053 COMPREHEN METABOLIC PANEL: CPT

## 2021-08-08 PROCEDURE — 85025 COMPLETE CBC W/AUTO DIFF WBC: CPT

## 2021-08-08 PROCEDURE — 74177 CT ABD & PELVIS W/CONTRAST: CPT | Mod: MA

## 2021-08-08 PROCEDURE — 80074 ACUTE HEPATITIS PANEL: CPT

## 2021-08-08 PROCEDURE — 85610 PROTHROMBIN TIME: CPT

## 2021-08-08 PROCEDURE — 99239 HOSP IP/OBS DSCHRG MGMT >30: CPT | Mod: GC

## 2021-08-08 PROCEDURE — 82248 BILIRUBIN DIRECT: CPT

## 2021-08-08 PROCEDURE — 82247 BILIRUBIN TOTAL: CPT

## 2021-08-08 PROCEDURE — 86381 MITOCHONDRIAL ANTIBODY EACH: CPT

## 2021-08-08 PROCEDURE — U0003: CPT

## 2021-08-08 PROCEDURE — 76700 US EXAM ABDOM COMPLETE: CPT

## 2021-08-08 PROCEDURE — 85027 COMPLETE CBC AUTOMATED: CPT

## 2021-08-08 PROCEDURE — 84100 ASSAY OF PHOSPHORUS: CPT

## 2021-08-08 PROCEDURE — 83735 ASSAY OF MAGNESIUM: CPT

## 2021-08-08 PROCEDURE — 86850 RBC ANTIBODY SCREEN: CPT

## 2021-08-08 PROCEDURE — 83690 ASSAY OF LIPASE: CPT

## 2021-08-08 PROCEDURE — 86664 EPSTEIN-BARR NUCLEAR ANTIGEN: CPT

## 2021-08-08 PROCEDURE — 86769 SARS-COV-2 COVID-19 ANTIBODY: CPT

## 2021-08-08 PROCEDURE — 86900 BLOOD TYPING SEROLOGIC ABO: CPT

## 2021-08-08 PROCEDURE — 86901 BLOOD TYPING SEROLOGIC RH(D): CPT

## 2021-08-08 NOTE — PROGRESS NOTE ADULT - ATTENDING COMMENTS
Continues to feel well. Has no further abdominal pain, tolerating diet. Bilirubin decreased to 2.2 today. AST normalized, ALT lagging behind but continues to downtrend. Etiology for presenting symptoms likely 2/2 passed gallstone. Hepatitis panel negative. Given information to f/u with Hepatology as outpatient. Medically cleared for discharge home today    d/c time spent 33 minutes

## 2021-08-08 NOTE — PROGRESS NOTE ADULT - ASSESSMENT
64 year old man with no PMH presents to the ED with 2 day history of abdominal pain, generalized malaise, fever, yellow diarrhea, and dark urine. CT A/P showed Diverticulosis without diverticulitis. Abdominal US showed fatty liver and a thick irregular fold in the gallbladder fundus of unclear etiology, no cholelithiasis, or sonographic evidence of cholecystitis.

## 2021-08-08 NOTE — PROGRESS NOTE ADULT - PROBLEM SELECTOR PLAN 3
DVT: no pharmacologic prophylaxis is indicated   Diet: DASH diet  Dispo: Home pending DVT: no pharmacologic prophylaxis is indicated   Diet: DASH diet  Dispo: Home today

## 2021-08-08 NOTE — PROGRESS NOTE ADULT - PROBLEM SELECTOR PLAN 1
-Acute onset jaundice since   -Dark urine on   -Yellow diarrhea from -   Direct Bili: 5.4   -Jaundice etiology due to infectious etiology most likely viral hepatitis, however must consider other causes such as bacterial infection, autoimmune, or obstruction  -Elevated bili less likely due to hemolytic anemia- LDH:198  haptoglobin: 179  CT A/P: Diverticulosis  Complete abdominal Ultrasound: showed fatty liver a thick irregular fold in the gallbladder fundus of unclear etiology, no cholelithiasis, or sonographic evidence of cholecystitis  T. bili downtrending - 7.3 --> 4.0 --> 2.2  Alk Phos downtrendin-->125 --> 105  AST downtrendin -->82 --> 40  ALT downtrendin -->212 --> 138    Plan:  -trend bilirubin, LFTs  -f/u hepatitis panel, SHAUN, Antimitochondrial antibodies, ANCA, CMV, EBV, Ehrlichia, Anaplasma, babesia labs -Acute onset jaundice since   -Dark urine on   -Yellow diarrhea from -   Direct Bili: 5.4   -Jaundice etiology due passed stone, however must consider other causes such as viral or bacterial infection, autoimmune  -Elevated bili less likely due to hemolytic anemia- LDH:198  haptoglobin: 179  CT A/P: Diverticulosis  Complete abdominal Ultrasound: showed fatty liver a thick irregular fold in the gallbladder fundus of unclear etiology, no cholelithiasis, or sonographic evidence of cholecystitis  T. bili downtrending - 7.3 --> 4.0 --> 2.2  Alk Phos downtrendin-->125 --> 105  AST downtrendin -->82 --> 40  ALT downtrendin -->212 --> 138    Plan:  -trend bilirubin, LFTs  -f/u hepatitis panel, SHAUN, Antimitochondrial antibodies, ANCA, CMV, EBV, Ehrlichia, Anaplasma, babesia labs

## 2021-08-08 NOTE — PROGRESS NOTE ADULT - SUBJECTIVE AND OBJECTIVE BOX
PROGRESS NOTE:    Rebecca Goodrich MD  Internal Medicine PGY-1  [phone]    Patient is a 61y old  Male who presents with a chief complaint of jaundice (07 Aug 2021 17:15)      SUBJECTIVE / OVERNIGHT EVENTS: No acute overnight events. Pt seen and examined. Denies fevers, chills, CP, SOB, Abdominal pain, N/V, Constipation, Diarrhea      MEDICATIONS  (STANDING):    MEDICATIONS  (PRN):  ondansetron Injectable 4 milliGRAM(s) IV Push every 8 hours PRN Nausea and/or Vomiting      I&O's Summary      Vital Signs Last 24 Hrs  T(C): 36.3 (08 Aug 2021 04:30), Max: 36.8 (07 Aug 2021 12:35)  T(F): 97.3 (08 Aug 2021 04:30), Max: 98.2 (07 Aug 2021 12:35)  HR: 60 (08 Aug 2021 04:30) (60 - 77)  BP: 145/92 (08 Aug 2021 04:30) (145/84 - 173/98)  BP(mean): --  RR: 18 (08 Aug 2021 04:30) (15 - 18)  SpO2: 95% (08 Aug 2021 04:30) (93% - 95%)    =================PHYSICAL EXAM=================    GENERAL: Laying comfortably, NAD  EYES: EOMI, PERRL, no scleral icterus  NECK: No JVD  LUNG: Clear to auscultation bilaterally; No wheeze, crackles or rhonci  HEART: Regular rate and rhythm; No murmurs, rubs, or gallops  ABDOMEN: Soft, Nontender, Nondistended  EXTREMITIES:  No LE edema, 2+ Peripheral Pulses, No clubbing, cyanosis, or edema  PSYCH: AAOx3  NEUROLOGY: non-focal, strength 5/5 in all extremities, sensation intact  SKIN: No rashes or lesions    =================================================    LABS:                        15.1   8.15  )-----------( 210      ( 07 Aug 2021 12:02 )             45.4     Auto Eosinophil # x     / Auto Eosinophil % x     / Auto Neutrophil # x     / Auto Neutrophil % x     / BANDS % x                            15.4   9.59  )-----------( 210      ( 06 Aug 2021 22:48 )             46.3     Auto Eosinophil # 0.06  / Auto Eosinophil % 0.6   / Auto Neutrophil # 7.50  / Auto Neutrophil % 78.3  / BANDS % x        08-07    136  |  99  |  14  ----------------------------<  178<H>  3.4<L>   |  21<L>  |  0.73  08-06    135  |  102  |  14  ----------------------------<  130<H>  3.7   |  21<L>  |  0.88    Ca    9.7      07 Aug 2021 12:02  Mg     2.2     08-07  Phos  1.8     08-07  TPro  7.3  /  Alb  3.9  /  TBili  4.0<H>  /  DBili  x   /  AST  82<H>  /  ALT  212<H>  /  AlkPhos  125<H>  08-07  TPro  7.2  /  Alb  4.0  /  TBili  7.3<H>  /  DBili  5.4<H>  /  AST  121<H>  /  ALT  260<H>  /  AlkPhos  128<H>  08-06    PT/INR - ( 07 Aug 2021 12:02 )   PT: 12.4 sec;   INR: 1.04 ratio         PTT - ( 07 Aug 2021 12:02 )  PTT:33.5 sec              RADIOLOGY & ADDITIONAL TESTS:    Imaging Personally Reviewed:    Consultant(s) Notes Reviewed:      Care Discussed with Consultants/Other Providers:   PROGRESS NOTE:    Rebecca Goodrich MD  Internal Medicine PGY-1  [phone]    Patient is a 61y old  Male who presents with a chief complaint of jaundice (07 Aug 2021 17:15)      SUBJECTIVE / OVERNIGHT EVENTS: No acute overnight events. Pt seen and examined. He states that he feels good today and is ready to go home. Denies fevers, chills, CP, SOB, Abdominal pain, N/V, Constipation, Diarrhea      MEDICATIONS  (STANDING):    MEDICATIONS  (PRN):  ondansetron Injectable 4 milliGRAM(s) IV Push every 8 hours PRN Nausea and/or Vomiting      I&O's Summary      Vital Signs Last 24 Hrs  T(C): 36.3 (08 Aug 2021 04:30), Max: 36.8 (07 Aug 2021 12:35)  T(F): 97.3 (08 Aug 2021 04:30), Max: 98.2 (07 Aug 2021 12:35)  HR: 60 (08 Aug 2021 04:30) (60 - 77)  BP: 145/92 (08 Aug 2021 04:30) (145/84 - 173/98)  BP(mean): --  RR: 18 (08 Aug 2021 04:30) (15 - 18)  SpO2: 95% (08 Aug 2021 04:30) (93% - 95%)    =================PHYSICAL EXAM=================    GENERAL: well developed, well groomed, Laying comfortably, NAD  HEENT: Atraumatic, EOMI, PERRL, scleral anicterus bilaterally, MMM  NECK: supple, No JVD  LUNG: Clear to auscultation bilaterally; No wheeze, crackles or rhonci  HEART: Regular rate and rhythm; No murmurs, rubs, or gallops  ABDOMEN: Soft, normoactive bowel sounds, Nontender, Nondistended, negative Mark's sign, no guarding  EXTREMITIES:  No LE edema, Peripheral Pulses intact, No clubbing, cyanosis, or edema  PSYCH: AAOx3, normal mood and affect  NEUROLOGY: non-focal, strength 5/5 in all extremities, sensation intact  SKIN: No rashes    =================================================    LABS:                        15.1   8.15  )-----------( 210      ( 07 Aug 2021 12:02 )             45.4     Auto Eosinophil # x     / Auto Eosinophil % x     / Auto Neutrophil # x     / Auto Neutrophil % x     / BANDS % x                            15.4   9.59  )-----------( 210      ( 06 Aug 2021 22:48 )             46.3     Auto Eosinophil # 0.06  / Auto Eosinophil % 0.6   / Auto Neutrophil # 7.50  / Auto Neutrophil % 78.3  / BANDS % x        08-07    136  |  99  |  14  ----------------------------<  178<H>  3.4<L>   |  21<L>  |  0.73  08-06    135  |  102  |  14  ----------------------------<  130<H>  3.7   |  21<L>  |  0.88    Ca    9.7      07 Aug 2021 12:02  Mg     2.2     08-07  Phos  1.8     08-07  TPro  7.3  /  Alb  3.9  /  TBili  4.0<H>  /  DBili  x   /  AST  82<H>  /  ALT  212<H>  /  AlkPhos  125<H>  08-07  TPro  7.2  /  Alb  4.0  /  TBili  7.3<H>  /  DBili  5.4<H>  /  AST  121<H>  /  ALT  260<H>  /  AlkPhos  128<H>  08-06    PT/INR - ( 07 Aug 2021 12:02 )   PT: 12.4 sec;   INR: 1.04 ratio         PTT - ( 07 Aug 2021 12:02 )  PTT:33.5 sec              RADIOLOGY & ADDITIONAL TESTS:    Imaging Personally Reviewed:    Consultant(s) Notes Reviewed:      Care Discussed with Consultants/Other Providers:

## 2021-08-08 NOTE — PROGRESS NOTE ADULT - PROBLEM SELECTOR PLAN 2
resolved    plan:   -ondansetron for nausea resolved. Likely 2/2 passed gallstone    plan:   -ondansetron for nausea

## 2021-08-09 LAB
EBV EA AB SER IA-ACNC: <5 U/ML — SIGNIFICANT CHANGE UP
EBV EA AB TITR SER IF: POSITIVE
EBV EA IGG SER-ACNC: NEGATIVE — SIGNIFICANT CHANGE UP
EBV NA IGG SER IA-ACNC: >600 U/ML — HIGH
EBV PATRN SPEC IB-IMP: SIGNIFICANT CHANGE UP
EBV VCA IGG AVIDITY SER QL IA: POSITIVE
EBV VCA IGM SER IA-ACNC: 455 U/ML — HIGH
EBV VCA IGM SER IA-ACNC: <10 U/ML — SIGNIFICANT CHANGE UP
EBV VCA IGM TITR FLD: NEGATIVE — SIGNIFICANT CHANGE UP

## 2021-08-10 LAB
A PHAGOCYTOPH DNA BLD QL NAA+PROBE: NEGATIVE — SIGNIFICANT CHANGE UP
AUTO DIFF PNL BLD: ABNORMAL
C-ANCA SER-ACNC: NEGATIVE — SIGNIFICANT CHANGE UP
CMV DNA CSF QL NAA+PROBE: SIGNIFICANT CHANGE UP
E CHAFFEENSIS DNA BLD QL NAA+PROBE: NEGATIVE — SIGNIFICANT CHANGE UP
E EWINGII DNA SPEC QL NAA+PROBE: NEGATIVE — SIGNIFICANT CHANGE UP
EHRLICHIA DNA SPEC QL NAA+PROBE: NEGATIVE — SIGNIFICANT CHANGE UP
MITOCHONDRIA AB SER-ACNC: SIGNIFICANT CHANGE UP
P-ANCA SER-ACNC: NEGATIVE — SIGNIFICANT CHANGE UP

## 2021-08-12 LAB
ANA TITR SER: NEGATIVE — SIGNIFICANT CHANGE UP
CULTURE RESULTS: SIGNIFICANT CHANGE UP
CULTURE RESULTS: SIGNIFICANT CHANGE UP
SPECIMEN SOURCE: SIGNIFICANT CHANGE UP
SPECIMEN SOURCE: SIGNIFICANT CHANGE UP

## 2021-12-10 NOTE — DISCHARGE NOTE PROVIDER - CARE PROVIDER_API CALL
Ralph Villa)  Gastroenterology; Internal Medicine  [address] [place] [address]  Phone: [phone]  Fax: [phone]  Follow Up Time:     Dong Thomason  GASTROENTEROLOGY  [address] [place] [address]  Phone: [phone]  Fax: [phone]  Follow Up Time:   
See HPI
